# Patient Record
Sex: MALE | Race: WHITE | NOT HISPANIC OR LATINO | Employment: FULL TIME | URBAN - NONMETROPOLITAN AREA
[De-identification: names, ages, dates, MRNs, and addresses within clinical notes are randomized per-mention and may not be internally consistent; named-entity substitution may affect disease eponyms.]

---

## 2023-06-24 ENCOUNTER — HOSPITAL ENCOUNTER (EMERGENCY)
Facility: HOSPITAL | Age: 28
Discharge: HOME | End: 2023-06-24
Attending: EMERGENCY MEDICINE
Payer: COMMERCIAL

## 2023-06-24 ENCOUNTER — APPOINTMENT (EMERGENCY)
Dept: RADIOLOGY | Facility: HOSPITAL | Age: 28
End: 2023-06-24
Payer: COMMERCIAL

## 2023-06-24 VITALS
TEMPERATURE: 97.6 F | SYSTOLIC BLOOD PRESSURE: 122 MMHG | DIASTOLIC BLOOD PRESSURE: 72 MMHG | RESPIRATION RATE: 18 BRPM | WEIGHT: 260 LBS | OXYGEN SATURATION: 95 % | HEART RATE: 92 BPM

## 2023-06-24 DIAGNOSIS — L03.90 CELLULITIS: Primary | ICD-10-CM

## 2023-06-24 PROCEDURE — 99283 EMERGENCY DEPT VISIT LOW MDM: CPT

## 2023-06-24 PROCEDURE — 73562 X-RAY EXAM OF KNEE 3: CPT

## 2023-06-24 RX ORDER — CEPHALEXIN 500 MG/1
500 CAPSULE ORAL EVERY 6 HOURS SCHEDULED
Qty: 20 CAPSULE | Refills: 0 | Status: SHIPPED | OUTPATIENT
Start: 2023-06-24 | End: 2023-06-29

## 2023-06-24 NOTE — ED ATTENDING ATTESTATION
Final Diagnosis:  1  Cellulitis           I, Jas Arevalo MD, saw and evaluated the patient  All available labs and X-rays were ordered by me or the resident and have been reviewed by myself  I discussed the patient with the resident / non-physician and agree with the resident's / non-physician practitioner's findings and plan as documented in the resident's / non-physician practicitioner's note, except where noted  At this point, I agree with the current assessment done in the ED  I was present during key portions of all procedures performed unless otherwise stated  Chief Complaint   Patient presents with   • Leg Swelling     Right lower leg swelling and redness for 3 days  Denies injury     This is a 33 y o  male presenting for evaluation of right leg erythema and pain  Patient states that on Thursday he had some pain in his right knee  The pain then improved but then he noticed some erythema in the area  He noted that last night this seemed to be that it was just inferior/distal to his right patella and then when he woke up today it was spread more diffusely over his right calf  No streaking towards the groin  He is unsure of any direct trauma to the area  Patient states that he is otherwise well  No fever or chills  States that originally had some discomfort when he first noted the swelling but that went away  Patient has no difficulty with ambulation  PMH:   has no past medical history on file  PSH:   has a past surgical history that includes EAR SURGERY; Tonsillectomy; and TONSILECTOMY AND ADNOIDECTOMY      Procedures     Social:  Social History     Substance and Sexual Activity   Alcohol Use Yes    Comment: social     Social History     Tobacco Use   Smoking Status Never   Smokeless Tobacco Never     Social History     Substance and Sexual Activity   Drug Use Never     PE:  Vitals:    06/24/23 0914 06/24/23 0915 06/24/23 0930   BP:  145/98 148/97   BP Location:  Right arm Right arm Pulse:  101 96   Resp: 18 18 18   Temp: 97 6 °F (36 4 °C)     TempSrc: Temporal     SpO2:  95% 96%   Weight: 118 kg (260 lb)         A:    Unless otherwise specified above:     General: VS reviewed  Appears in NAD     Head: Normocephalic, atraumatic     CV: No pallor noted  Lungs:   No respiratory distress     Abdomen:  Soft, non-tender, non-distended     MSK:   No obvious deformity     Skin: No obvious rash  Neuro: Awake, alert, GCS15, CN II-XII grossly intact  Speaking in full sentences  Motor grossly intact  Psychiatric/Behavioral: Appropriate mood and affect   Exam: deferred    P:  -Patient has diffuse erythema around dorsal aspect of right calf  This is blanchable  No crepitus  No bony malformation  Patient ambulates without issue  He can fully range his joint without pain  - Patient is nontoxic on exam   No ongoing medical concerns  Given current exam appears consistent with a superficial cellulitis  No risk factors for a septic arthritis and exam is not consistent with this  I discussed options with patient  Including possible blood work for further evaluation  Would like to defer at this time which I believe is reasonable  Will cover with antibiotics  Return precautions reviewed  - 13 point ROS was performed and all are normal unless stated in the history above  - Nursing note reviewed  Vitals reviewed  - Orders placed by myself and/or advanced practitioner / resident     - Previous chart was reviewed  - No language barrier    - History obtained from patient  - There are no limitations to the history obtained         Code Status: No Order  Advance Directive and Living Will:      Power of :    POLST:      Medications - No data to display  XR knee 3 views right non injury   ED Interpretation   No obvious fracture, interpretation        Orders Placed This Encounter   Procedures   • XR knee 3 views right non injury     Labs Reviewed - No data to display  Time reflects "when diagnosis was documented in both MDM as applicable and the Disposition within this note     Time User Action Codes Description Comment    6/24/2023 10:13 AM Bowen Barroso Add [T70 07] Cellulitis       ED Disposition     ED Disposition   Discharge    Condition   Stable    Date/Time   Sat Jun 24, 2023 10:14 AM    Comment   Candance Shoals discharge to home/self care  Follow-up Information     Follow up With Specialties Details Why Contact Info    Carmen Mccormick MD Family Medicine  As needed [address] [place] [address] [id number] [phone]          Patient's Medications   Discharge Prescriptions    CEPHALEXIN (KEFLEX) 500 MG CAPSULE    Take 1 capsule (500 mg total) by mouth every 6 (six) hours for 5 days       Start Date: 6/24/2023 End Date: 6/29/2023       Order Dose: 500 mg       Quantity: 20 capsule    Refills: 0     No discharge procedures on file  None       Portions of the record may have been created with voice recognition software  Occasional wrong word or \"sound a like\" substitutions may have occurred due to the inherent limitations of voice recognition software  Read the chart carefully and recognize, using context, where substitutions have occurred      Electronically signed by:  Wenceslao Dominguez    "

## 2023-06-24 NOTE — ED PROVIDER NOTES
History  Chief Complaint   Patient presents with   • Leg Swelling     Right lower leg swelling and redness for 3 days  Denies injury     Patient is a 31-year-old male with no past medical history presents to the ER with complaints of right knee pain and swelling that started on Thursday night  Patient states that he noticed redness and swelling on his right knee and developed a fever that night  He states that the redness has now been extending to his lower extremity up to the ankle  Patient reports on and off fever, did not record his temperature  Patient states that he had difficulty moving his knee joint on Thursday but has improved since  He complains of burning sensation on his skin when touching  Denies any trauma, wound, chest pain, SOB, abdominal pain, n/v             None       History reviewed  No pertinent past medical history  Past Surgical History:   Procedure Laterality Date   • EAR SURGERY     • TONSILECTOMY AND ADNOIDECTOMY     • TONSILLECTOMY         History reviewed  No pertinent family history  I have reviewed and agree with the history as documented  E-Cigarette/Vaping   • E-Cigarette Use Never User      E-Cigarette/Vaping Substances     Social History     Tobacco Use   • Smoking status: Never   • Smokeless tobacco: Never   Vaping Use   • Vaping Use: Never used   Substance Use Topics   • Alcohol use: Yes     Comment: social   • Drug use: Never        Review of Systems   Constitutional: Positive for chills and fever  HENT: Negative for ear pain and sore throat  Eyes: Negative for pain and visual disturbance  Respiratory: Negative for cough and shortness of breath  Cardiovascular: Negative for chest pain and palpitations  Gastrointestinal: Negative for abdominal pain and vomiting  Genitourinary: Negative for dysuria and hematuria  Musculoskeletal: Positive for arthralgias and joint swelling  Negative for back pain  Skin: Positive for color change  Negative for rash  Neurological: Negative for seizures and syncope  All other systems reviewed and are negative  Physical Exam  ED Triage Vitals   Temperature Pulse Respirations Blood Pressure SpO2   06/24/23 0914 06/24/23 0915 06/24/23 0914 06/24/23 0915 06/24/23 0915   97 6 °F (36 4 °C) 101 18 145/98 95 %      Temp Source Heart Rate Source Patient Position - Orthostatic VS BP Location FiO2 (%)   06/24/23 0914 06/24/23 0915 06/24/23 0915 06/24/23 0915 --   Temporal Monitor Sitting Right arm       Pain Score       06/24/23 0914       2             Orthostatic Vital Signs  Vitals:    06/24/23 0915   BP: 145/98   Pulse: 101   Patient Position - Orthostatic VS: Sitting       Physical Exam  Vitals and nursing note reviewed  Constitutional:       General: He is not in acute distress  Appearance: He is well-developed  HENT:      Head: Normocephalic and atraumatic  Eyes:      Conjunctiva/sclera: Conjunctivae normal    Cardiovascular:      Rate and Rhythm: Normal rate and regular rhythm  Heart sounds: No murmur heard  Pulmonary:      Effort: Pulmonary effort is normal  No respiratory distress  Breath sounds: Normal breath sounds  Abdominal:      Palpations: Abdomen is soft  Tenderness: There is no abdominal tenderness  Musculoskeletal:      Cervical back: Neck supple  Right knee: Swelling present  No bony tenderness  Tenderness present over the medial joint line  Left knee: Normal  No swelling  Right lower leg: No edema  Left lower leg: No edema  Comments: Mild swelling noted on the medial side of the right knee joint  Skin appears erythematous from right knee joint, radiating until ankle  No clear demarcation noted  Skin appears warm to touch  Mild tenderness to palpation of medial side of the knee joint and along the erythematous skin of the lower extremity  Tiny wound noted on the shaft below the knee joint     Left leg appears normal, although erythematous wound with no discharge noted on the lateral side of the lower extremity of left leg  No restriction of movement noted  ROM-normal   Skin:     General: Skin is warm and dry  Capillary Refill: Capillary refill takes less than 2 seconds  Neurological:      Mental Status: He is alert  Psychiatric:         Mood and Affect: Mood normal          ED Medications  Medications - No data to display    Diagnostic Studies  Results Reviewed     None                 No orders to display         Procedures  Procedures      ED Course       SBIRT 22yo+    Flowsheet Row Most Recent Value   Initial Alcohol Screen: US AUDIT-C     1  How often do you have a drink containing alcohol? 0 Filed at: 06/24/2023 0917   Audit-C Score 0 Filed at: 06/24/2023 0718   PAVAN: How many times in the past year have you    Used an illegal drug or used a prescription medication for non-medical reasons? Never Filed at: 06/24/2023 3772                Medical Decision Making  Patient is a 60-year-old male presenting with swelling erythema and tenderness of his right knee joint radiating along his right lower extremity until the ankle  No clear demarcation of skin noted  Differential include septic arthritis vs cellulitis  Patient appears nontoxic, afebrile, range of motion of right knee intact, no restriction of motion, patient able to walk  Given this presentation, low likelihood of septic arthritis  Will obtain x-ray of right knee  No obvious fracture appreciated  Will treat for cellulitis with Keflex 500 mg 4 times daily for 5 days  Return precautions discussed  Amount and/or Complexity of Data Reviewed  Radiology: ordered  Disposition  Final diagnoses:   None     ED Disposition     None      Follow-up Information    None         Patient's Medications    No medications on file     No discharge procedures on file  PDMP Review     None           ED Provider  Attending physically available and evaluated Bayron Trotter I managed the patient along with the ED Attending      Electronically Signed by         Jessica Montelongo MD  06/24/23 1010

## 2024-02-03 ENCOUNTER — HOSPITAL ENCOUNTER (EMERGENCY)
Facility: HOSPITAL | Age: 29
Discharge: HOME | End: 2024-02-03
Attending: EMERGENCY MEDICINE | Admitting: EMERGENCY MEDICINE
Payer: COMMERCIAL

## 2024-02-03 VITALS
RESPIRATION RATE: 18 BRPM | SYSTOLIC BLOOD PRESSURE: 145 MMHG | TEMPERATURE: 96.8 F | OXYGEN SATURATION: 97 % | DIASTOLIC BLOOD PRESSURE: 87 MMHG | WEIGHT: 282.41 LBS | HEART RATE: 85 BPM

## 2024-02-03 DIAGNOSIS — L30.9 ECZEMA OF LOWER LEG: ICD-10-CM

## 2024-02-03 DIAGNOSIS — L30.9 ECZEMA OF LOWER LEG: Primary | ICD-10-CM

## 2024-02-03 PROCEDURE — 99282 EMERGENCY DEPT VISIT SF MDM: CPT

## 2024-02-03 PROCEDURE — 99284 EMERGENCY DEPT VISIT MOD MDM: CPT | Performed by: EMERGENCY MEDICINE

## 2024-02-03 RX ORDER — MOMETASONE FUROATE 1 MG/G
CREAM TOPICAL DAILY
Qty: 45 G | Refills: 0 | Status: SHIPPED | OUTPATIENT
Start: 2024-02-03 | End: 2024-02-05 | Stop reason: SDUPTHER

## 2024-02-03 NOTE — ED PROVIDER NOTES
History  Chief Complaint   Patient presents with    Follow-Up Cellulitis     Patient reports that he was recently on antibiotics for cellulitis of left lower leg. States no improvement     29 yo man presents for evaluation of left lateral leg rash. The rash has been persistent for 2 months, without clear trigger. He reports no increase in erythema, no increase in size of the rash, no drainage, no tenderness, no pain. No fever. Feels very itchy and dry in the skin. About 1.5 weeks ago, he saw a doctor on virtual visit, was diagnosed with cellulitis and prescribed 7 days of bactrim. He went to walk-in urgent care on 1/27, advised to continue bactrim, doppler study with no thrombosis. He saw a virtual visit again after 1/27, because no improvement in the rash, was advised to complete bactrim. He completed 7 days of bactrim without improvement. He also used triple antibiotic ointment without improvement.     He is able to walk, work, sleep, and eat as usual.   No oral antibiotic prior to the recent bactrim for the rash. Feeling well and healthy.           None       History reviewed. No pertinent past medical history.    Past Surgical History:   Procedure Laterality Date    EAR SURGERY      TONSILECTOMY AND ADNOIDECTOMY      TONSILLECTOMY         History reviewed. No pertinent family history.  I have reviewed and agree with the history as documented.    E-Cigarette/Vaping    E-Cigarette Use Never User      E-Cigarette/Vaping Substances     Social History     Tobacco Use    Smoking status: Never    Smokeless tobacco: Never   Vaping Use    Vaping status: Never Used   Substance Use Topics    Alcohol use: Yes     Comment: social    Drug use: Never        Review of Systems   Constitutional:  Negative for activity change and fever.   HENT:  Negative for hearing loss and sore throat.    Eyes:  Negative for discharge and visual disturbance.   Respiratory:  Negative for cough and shortness of breath.    Cardiovascular:  Negative  for chest pain and palpitations.   Gastrointestinal:  Negative for abdominal pain and nausea.   Genitourinary:  Negative for difficulty urinating and dysuria.   Musculoskeletal:  Negative for arthralgias and back pain.   Skin:  Positive for color change and rash.   Neurological:  Negative for dizziness and light-headedness.       Physical Exam  ED Triage Vitals [02/03/24 0914]   Temperature Pulse Respirations Blood Pressure SpO2   (!) 96.8 °F (36 °C) 85 18 145/87 97 %      Temp Source Heart Rate Source Patient Position - Orthostatic VS BP Location FiO2 (%)   Temporal Monitor Lying Left arm --      Pain Score       5             Orthostatic Vital Signs  Vitals:    02/03/24 0914   BP: 145/87   Pulse: 85   Patient Position - Orthostatic VS: Lying       Physical Exam  Vitals and nursing note reviewed.   Constitutional:       General: He is not in acute distress.     Appearance: He is well-developed.   HENT:      Head: Normocephalic and atraumatic.   Eyes:      Conjunctiva/sclera: Conjunctivae normal.   Cardiovascular:      Rate and Rhythm: Normal rate and regular rhythm.      Heart sounds: No murmur heard.  Pulmonary:      Effort: Pulmonary effort is normal. No respiratory distress.      Breath sounds: Normal breath sounds.   Abdominal:      Palpations: Abdomen is soft.      Tenderness: There is no abdominal tenderness.   Musculoskeletal:         General: No swelling.      Cervical back: Neck supple.   Skin:     General: Skin is warm and dry.      Capillary Refill: Capillary refill takes less than 2 seconds.      Findings: Erythema and rash present.      Comments: Left lateral leg skin - erythematous patch with dry flaky skin superimposed on the rash, no clear border, no warmth to touch, no tenderness, no drainage. Skin texture is rough and without fissure or opening.   No popliteal LAD.    Neurological:      Mental Status: He is alert.   Psychiatric:         Mood and Affect: Mood normal.           ED  Medications  Medications - No data to display    Diagnostic Studies  Results Reviewed       None                   No orders to display         Procedures  Procedures      ED Course  ED Course as of 02/03/24 1008   Sat Feb 03, 2024   0955 History and physical does NOT correlate with cellulitis. He most likely has eczema. Already completed PO antibiotics bactrim x 7 days without improvement. Given the length of his cutaneous condition of 2 month, without worsening despite no antibiotics, this is not cellulitis. Will treat for eczema with moderate intensity steroid + topical moisturizer cream                             SBIRT 22yo+      Flowsheet Row Most Recent Value   Initial Alcohol Screen: US AUDIT-C     1. How often do you have a drink containing alcohol? 0 Filed at: 02/03/2024 0915   2. How many drinks containing alcohol do you have on a typical day you are drinking?  0 Filed at: 02/03/2024 0915   3a. Male UNDER 65: How often do you have five or more drinks on one occasion? 0 Filed at: 02/03/2024 0915   3b. FEMALE Any Age, or MALE 65+: How often do you have 4 or more drinks on one occassion? 0 Filed at: 02/03/2024 0915   Audit-C Score 0 Filed at: 02/03/2024 0915   PAVAN: How many times in the past year have you...    Used an illegal drug or used a prescription medication for non-medical reasons? Never Filed at: 02/03/2024 0915                  Medical Decision Making  Risk  Prescription drug management.          Disposition  Final diagnoses:   Eczema of lower leg     Time reflects when diagnosis was documented in both MDM as applicable and the Disposition within this note       Time User Action Codes Description Comment    2/3/2024  9:53 AM Abida Langford Add [L30.9] Eczema of lower leg           ED Disposition       ED Disposition   Discharge    Condition   Stable    Date/Time   Sat Feb 3, 2024 0957    Comment   Gallo Cisse discharge to home/self care.                   Follow-up Information       Follow up  With Specialties Details Why Contact Info    Kd Napoles MD Family Medicine Call  If symptoms worsen - you can ask to see a resident doctor within a few days when you call this office, to get primary care doctor locally., As needed [address] [facility] [address]  [phone]              Discharge Medication List as of 2/3/2024  9:57 AM        START taking these medications    Details   mometasone (ELOCON) 0.1 % cream Apply topically daily for 14 days Apply to your right leg skin twice daily, for 14 days, then STOP the topical steroid to prevent skin thinning, Starting Sat 2/3/2024, Until Sat 2/17/2024, Normal           No discharge procedures on file.    PDMP Review       None             ED Provider  Attending physically available and evaluated Gallo Cisse. I managed the patient along with the ED Attending.    Electronically Signed by           Abida Langford MD  02/03/24 1199

## 2024-02-03 NOTE — DISCHARGE INSTRUCTIONS
1. Cetaphil moisturizer cream twice daily - No fragrance   2. Use mometasone steroid cream twice daily for 14 days then stop this medication  3. If not improving, call hometown primary care office in [place] to get PCP appointment, you can see a resident doctor in 1-3 days

## 2024-02-05 RX ORDER — MOMETASONE FUROATE 1 MG/G
CREAM TOPICAL 2 TIMES DAILY
Qty: 45 G | Refills: 0 | Status: SHIPPED | OUTPATIENT
Start: 2024-02-05 | End: 2024-02-05

## 2024-02-05 RX ORDER — MOMETASONE FUROATE 1 MG/G
CREAM TOPICAL 2 TIMES DAILY
Qty: 45 G | Refills: 0 | Status: SHIPPED | OUTPATIENT
Start: 2024-02-05 | End: 2024-02-19

## 2024-02-05 RX ORDER — MOMETASONE FUROATE 1 MG/G
CREAM TOPICAL
Qty: 45 G | Refills: 0 | OUTPATIENT
Start: 2024-02-05

## 2024-02-05 NOTE — TELEPHONE ENCOUNTER
Prescription was refused due to not clarifying whether once or twice daily dosing, clarified it to twice daily. Also was concern Dr. Langford had not seen the patient, upon chart review she evaluated the patient in the ED alongside Dr. Whitten and did have clinical interaction with him, thus I have no concerns about reordering on Dr. Langford's behalf while covering her inbasket today.  -Tobias Varma MD